# Patient Record
Sex: MALE | Race: BLACK OR AFRICAN AMERICAN | URBAN - METROPOLITAN AREA
[De-identification: names, ages, dates, MRNs, and addresses within clinical notes are randomized per-mention and may not be internally consistent; named-entity substitution may affect disease eponyms.]

---

## 2024-11-05 ENCOUNTER — OFFICE VISIT (OUTPATIENT)
Dept: URGENT CARE | Age: 29
End: 2024-11-05
Payer: COMMERCIAL

## 2024-11-05 VITALS
WEIGHT: 250 LBS | SYSTOLIC BLOOD PRESSURE: 136 MMHG | OXYGEN SATURATION: 96 % | TEMPERATURE: 98.7 F | HEART RATE: 73 BPM | RESPIRATION RATE: 16 BRPM | DIASTOLIC BLOOD PRESSURE: 82 MMHG

## 2024-11-05 DIAGNOSIS — K64.9 HEMORRHOIDS, UNSPECIFIED HEMORRHOID TYPE: Primary | ICD-10-CM

## 2024-11-05 PROCEDURE — 99203 OFFICE O/P NEW LOW 30 MIN: CPT | Performed by: PHYSICIAN ASSISTANT

## 2024-11-05 RX ORDER — HYDROCORTISONE ACETATE 25 MG/1
25 SUPPOSITORY RECTAL 2 TIMES DAILY
Qty: 28 SUPPOSITORY | Refills: 0 | Status: SHIPPED | OUTPATIENT
Start: 2024-11-05 | End: 2024-11-19

## 2024-11-05 ASSESSMENT — PAIN SCALES - GENERAL: PAINLEVEL_OUTOF10: 7

## 2024-11-05 NOTE — PATIENT INSTRUCTIONS
"What are hemorrhoids?  Hemorrhoids are swollen veins in the rectum. They can cause itching, bleeding, and pain. Hemorrhoids are very common.  In some cases, you can see or feel hemorrhoids around the outside of the rectum. In other cases, you cannot see them because they are hidden inside the rectum.    What are the symptoms of hemorrhoids?  Hemorrhoids do not always cause symptoms. But when they do, symptoms can include:  -Itching of the skin around the anus  -Bleeding - Bleeding is usually painless. You might see bright red blood after using the toilet.  -Pain - If a blood clot forms inside a hemorrhoid, this can cause pain. It can also cause a lump that you might be able to feel.    What can I do to keep from getting more hemorrhoids?  The most important thing you can do is to keep from getting constipated. You should have a bowel movement at least a few times a week. When you have a bowel movement, you also should not have to push too much. Plus, your bowel movements should not be too hard.  Being constipated and having hard bowel movements can make hemorrhoids worse. Here are some steps you can take to avoid getting constipated or having hard stools:    -Eat lots of fruits, vegetables, and other foods with fiber. Fiber helps to increase bowel movements.  You need 20 to 35 grams of fiber a day to keep your bowel movements regular. If you do not get enough fiber from your diet, you can take fiber supplements such as: (Citrucel), polycarbophil (sample brand name: FiberCon), and wheat dextrin (sample brand name: Benefiber). If you take a fiber supplement, be sure to read the label so you know how much to take.     -Take medicines called \"stool softeners\" such as docusate sodium (sample brand names: Colace, Dulcolax). These medicines increase the number of bowel movements you have. They are safe to take and they can prevent problems later.    What can I do to reduce my symptoms?  Some people feel better if they soak " their buttocks in 2 or 3 inches of warm water. You can do this up to 2 to 3 times a day for 10 to 15 minutes. Do not add soap, bubble bath, or anything to the water. There are also medicines that you can get without a prescription. They are usually creams or ointments that you rub on your anus to relieve pain, itching, and swelling. Some hemorrhoid medicines come in a capsule (called a suppository) that you put inside your rectum. Do not use medicines that have hydrocortisone (a steroid medicine) for more than a week, unless your doctor or nurse approves.    Home Care:   1. Begin taking 1 capful of MiraLAX in a warm liquid once a day, if bowel movements have not become soft, begin taking 2 capfuls of MiraLAX in a warm liquid, and drink plenty of water.  You can also try taking magnesium to soften stools.  2. Eat soft easier to digest foods such as rice, soup, bread, crackers, cereal, yogurt, bananas, and applesauce.   3. Sits baths as described above.  4. Follow up with your primary care physician, or gastroenterologist if symptoms are not improving within 48 hours.  5.  Pedialyte sport, or liquid IV packets can help replace fluids and keep the correct pH balance.    Call your doctor or go to the emergency department if worse or:  1. Fever (temperature greater than 102°F [39°C]) occurs.   2. There is blood in the stool (poop) or diarrhea or if the stool (poop) is black.   3. Lots of diarrhea occurs.   4. Lots of vomiting occurs or the vomit is bloody or green or looks like chocolate or coffee.   5. The belly looks very full or big.   6. Symptoms of dehydration occurs (eg, inside of the mouth looks sticky, urinating less, weakness, tiredness, pale color, eyes look hollow or sunken).   7. Abdominal pain is worse.  8. Unable to eat or drink.

## 2024-11-05 NOTE — PROGRESS NOTES
Subjective   Patient ID: Fred Founrier is a 28 y.o. male. They present today with a chief complaint of Hemorrhoids (X 1 year unable to sit).    History of Present Illness  HPI  Patient presents to the urgent care as a 28-year-old male with intermittent hemorrhoids.  Patient states he usually has a flareup of his hemorrhoids when he eats bad, usually resolves within 2 weeks without treatment.  Patient states his hemorrhoids have not resolved this time.  Patient states he has noticed some bleeding with bowel movements and when wiping.  Patient reports his pain is 9/10.    Past Medical History  Allergies as of 11/05/2024    (No Known Allergies)       (Not in a hospital admission)       No past medical history on file.    No past surgical history on file.     reports that he has been smoking cigarettes. He has been exposed to tobacco smoke. He does not have any smokeless tobacco history on file.    Review of Systems  Review of Systems     Patient denies sore throat, fever, nausea, diarrhea, vomiting, rash, dizziness, shortness of breath, increased urinary frequency, chills, peripheral edema, abdominal pain, chest pain.                          Objective    Vitals:    11/05/24 1613   BP: 136/82   Pulse: 73   Resp: 16   Temp: 37.1 °C (98.7 °F)   TempSrc: Oral   SpO2: 96%   Weight: 113 kg (250 lb)     No LMP for male patient.    Physical Exam  Appearance: Alert, oriented, cooperative, in no acute distress. Well nourished & well hydrated.    Skin: Intact, no lesions, rash, petechiae or purpura. Good skin turgor    Eyes: Conjunctiva pink with no redness or exudates. Eyelids without lesions. No scleral icterus.     ENT: Hearing grossly intact. External inspection of ears without lesions or erythema. no nasal flaring. moist oral mucosa, no tripoding, no drooling, no difficulty swallowing oral secretions.    Pulmonary:  Good chest wall excursion. No accessory muscle use or stridor. no difficulty completing full sentences  without taking a breath    Cardiac:  No JVD.     Musculoskeletal: no deformity. No cyanosis, clubbing.     : rectal exam Radha x-ray tech present, non-thrombosed reducible hemorrhoid approximately 1 cm x 0.5 cm, no active bleeding, source of bleeding identified 0.2 cm, silver nitrate was used to cauterize bleeding source. Topical lidocaine was applied and non-adherent.    Neurological: no focal findings identified.    Psychiatric: Appropriate mood and affect.    Procedures    Point of Care Test & Imaging Results from this visit  No results found for this visit on 11/05/24.   No results found.    Diagnostic study results (if any) were reviewed by Reina Chirinos PA-C.    Assessment/Plan   Allergies, medications, history, and pertinent labs/EKGs/Imaging reviewed by Reina Chirinos PA-C.     Medical Decision Making  Patient presents to the urgent care as a 28-year-old male with non-thrombosed hemorrhoid.  Patient has correlated his eating habits to worsening symptoms of his hemorrhoid.    On physical exam patient had irreducible external nonthrombosed hemorrhoid, tender to very light touch, no active bleeding. Discussed increasing fluids, and taking MiraLAX, stool softener, Anusol HC was prescribed.  Patient was advised to follow-up with GI for further evaluation. If symptoms are not improving or if they are worsening the patient will call their primary care physician and go to the ER. Patient verbalizes understanding and agrees with plan of care. All questions were answered.    Dictation software was used in the creation of this note which does not evaluate or correct for typographical, spelling, syntax or grammatical errors.    Orders and Diagnoses  There are no diagnoses linked to this encounter.    Medical Admin Record      Patient disposition: Home    Electronically signed by Reina Chirinos PA-C  4:20 PM